# Patient Record
Sex: FEMALE | Race: WHITE | NOT HISPANIC OR LATINO | URBAN - METROPOLITAN AREA
[De-identification: names, ages, dates, MRNs, and addresses within clinical notes are randomized per-mention and may not be internally consistent; named-entity substitution may affect disease eponyms.]

---

## 2017-08-10 ENCOUNTER — OUTPATIENT (OUTPATIENT)
Dept: OUTPATIENT SERVICES | Facility: HOSPITAL | Age: 62
LOS: 1 days | End: 2017-08-10
Payer: COMMERCIAL

## 2017-08-10 VITALS
DIASTOLIC BLOOD PRESSURE: 96 MMHG | WEIGHT: 190.04 LBS | RESPIRATION RATE: 16 BRPM | HEIGHT: 63 IN | SYSTOLIC BLOOD PRESSURE: 157 MMHG | HEART RATE: 83 BPM | TEMPERATURE: 98 F

## 2017-08-10 DIAGNOSIS — N84.0 POLYP OF CORPUS UTERI: ICD-10-CM

## 2017-08-10 DIAGNOSIS — Z98.890 OTHER SPECIFIED POSTPROCEDURAL STATES: Chronic | ICD-10-CM

## 2017-08-10 DIAGNOSIS — Z01.818 ENCOUNTER FOR OTHER PREPROCEDURAL EXAMINATION: ICD-10-CM

## 2017-08-10 LAB
ALBUMIN SERPL ELPH-MCNC: 4 G/DL — SIGNIFICANT CHANGE UP (ref 3.3–5)
ALP SERPL-CCNC: 84 U/L — SIGNIFICANT CHANGE UP (ref 40–120)
ALT FLD-CCNC: 24 U/L — SIGNIFICANT CHANGE UP (ref 12–78)
ANION GAP SERPL CALC-SCNC: 6 MMOL/L — SIGNIFICANT CHANGE UP (ref 5–17)
AST SERPL-CCNC: 14 U/L — LOW (ref 15–37)
BILIRUB SERPL-MCNC: 1.2 MG/DL — SIGNIFICANT CHANGE UP (ref 0.2–1.2)
BUN SERPL-MCNC: 15 MG/DL — SIGNIFICANT CHANGE UP (ref 7–23)
CALCIUM SERPL-MCNC: 9.8 MG/DL — SIGNIFICANT CHANGE UP (ref 8.5–10.1)
CHLORIDE SERPL-SCNC: 106 MMOL/L — SIGNIFICANT CHANGE UP (ref 96–108)
CO2 SERPL-SCNC: 30 MMOL/L — SIGNIFICANT CHANGE UP (ref 22–31)
CREAT SERPL-MCNC: 0.64 MG/DL — SIGNIFICANT CHANGE UP (ref 0.5–1.3)
GLUCOSE SERPL-MCNC: 100 MG/DL — HIGH (ref 70–99)
HCT VFR BLD CALC: 43.3 % — SIGNIFICANT CHANGE UP (ref 34.5–45)
HGB BLD-MCNC: 15.2 G/DL — SIGNIFICANT CHANGE UP (ref 11.5–15.5)
MCHC RBC-ENTMCNC: 31 PG — SIGNIFICANT CHANGE UP (ref 27–34)
MCHC RBC-ENTMCNC: 35.2 GM/DL — SIGNIFICANT CHANGE UP (ref 32–36)
MCV RBC AUTO: 87.9 FL — SIGNIFICANT CHANGE UP (ref 80–100)
PLATELET # BLD AUTO: 280 K/UL — SIGNIFICANT CHANGE UP (ref 150–400)
POTASSIUM SERPL-MCNC: 4.6 MMOL/L — SIGNIFICANT CHANGE UP (ref 3.5–5.3)
POTASSIUM SERPL-SCNC: 4.6 MMOL/L — SIGNIFICANT CHANGE UP (ref 3.5–5.3)
PROT SERPL-MCNC: 7.6 G/DL — SIGNIFICANT CHANGE UP (ref 6–8.3)
RBC # BLD: 4.92 M/UL — SIGNIFICANT CHANGE UP (ref 3.8–5.2)
RBC # FLD: 11.7 % — SIGNIFICANT CHANGE UP (ref 10.3–14.5)
SODIUM SERPL-SCNC: 142 MMOL/L — SIGNIFICANT CHANGE UP (ref 135–145)
WBC # BLD: 7 K/UL — SIGNIFICANT CHANGE UP (ref 3.8–10.5)
WBC # FLD AUTO: 7 K/UL — SIGNIFICANT CHANGE UP (ref 3.8–10.5)

## 2017-08-10 PROCEDURE — 86901 BLOOD TYPING SEROLOGIC RH(D): CPT

## 2017-08-10 PROCEDURE — 80053 COMPREHEN METABOLIC PANEL: CPT

## 2017-08-10 PROCEDURE — 93010 ELECTROCARDIOGRAM REPORT: CPT | Mod: NC

## 2017-08-10 PROCEDURE — 85027 COMPLETE CBC AUTOMATED: CPT

## 2017-08-10 PROCEDURE — 36415 COLL VENOUS BLD VENIPUNCTURE: CPT

## 2017-08-10 PROCEDURE — 93005 ELECTROCARDIOGRAM TRACING: CPT

## 2017-08-10 PROCEDURE — G0463: CPT

## 2017-08-10 PROCEDURE — 86850 RBC ANTIBODY SCREEN: CPT

## 2017-08-10 PROCEDURE — 86900 BLOOD TYPING SEROLOGIC ABO: CPT

## 2017-08-10 NOTE — H&P PST ADULT - FAMILY HISTORY
Father  Still living? No  Family history of heart attack, Age at diagnosis: Age Unknown  Family history of colon cancer, Age at diagnosis: Age Unknown

## 2017-08-10 NOTE — H&P PST ADULT - HISTORY OF PRESENT ILLNESS
61 yo female with HTN, presents to [facility] scheduled for D&C hysteroscopy polypectomy on  with Dr. Elizondo.  0 LMP . Was found to a have a polyp on a routine exam. Denies PMB. H/O uterine polyp and endometrial thickening. H/O D&C .

## 2017-08-10 NOTE — H&P PST ADULT - NSANTHOSAYNRD_GEN_A_CORE
No. ANYI screening performed.  STOP BANG Legend: 0-2 = LOW Risk; 3-4 = INTERMEDIATE Risk; 5-8 = HIGH Risk

## 2017-08-10 NOTE — H&P PST ADULT - PMH
Dry eyes, bilateral    Enlarged thyroid  with nodules "subclinical"  Essential hypertension    Obesity (BMI 30.0-34.9)    Polyp of corpus uteri Dry eyes, bilateral    Enlarged thyroid  with nodules "subclinical"  Essential hypertension    LBBB (left bundle branch block)    Obesity (BMI 30.0-34.9)    Polyp of corpus uteri

## 2017-08-10 NOTE — H&P PST ADULT - PROBLEM SELECTOR PLAN 2
labs- CBC, CMP, T&S and EKG  MC with Dr. ANDREW Kirby  Pre op instructions reviewed and given. Instructed to take her Losartan am of surgery with  sip of water. Hold ASA and Lovaza one week pre op. Avoid OTC supplements. Verbalized understanding.

## 2017-08-17 RX ORDER — SODIUM CHLORIDE 9 MG/ML
1000 INJECTION, SOLUTION INTRAVENOUS
Qty: 0 | Refills: 0 | Status: DISCONTINUED | OUTPATIENT
Start: 2017-08-18 | End: 2017-08-18

## 2017-08-17 RX ORDER — ACETAMINOPHEN 500 MG
975 TABLET ORAL ONCE
Qty: 0 | Refills: 0 | Status: COMPLETED | OUTPATIENT
Start: 2017-08-18 | End: 2017-08-18

## 2017-08-18 ENCOUNTER — OUTPATIENT (OUTPATIENT)
Dept: OUTPATIENT SERVICES | Facility: HOSPITAL | Age: 62
LOS: 1 days | Discharge: HOME | End: 2017-08-18
Payer: COMMERCIAL

## 2017-08-18 VITALS
DIASTOLIC BLOOD PRESSURE: 80 MMHG | SYSTOLIC BLOOD PRESSURE: 167 MMHG | TEMPERATURE: 98 F | OXYGEN SATURATION: 97 % | RESPIRATION RATE: 13 BRPM | WEIGHT: 190.04 LBS | HEIGHT: 63 IN | HEART RATE: 73 BPM

## 2017-08-18 VITALS
HEART RATE: 70 BPM | SYSTOLIC BLOOD PRESSURE: 160 MMHG | OXYGEN SATURATION: 98 % | RESPIRATION RATE: 14 BRPM | DIASTOLIC BLOOD PRESSURE: 64 MMHG

## 2017-08-18 DIAGNOSIS — Z98.890 OTHER SPECIFIED POSTPROCEDURAL STATES: Chronic | ICD-10-CM

## 2017-08-18 DIAGNOSIS — N84.0 POLYP OF CORPUS UTERI: ICD-10-CM

## 2017-08-18 DIAGNOSIS — Z01.818 ENCOUNTER FOR OTHER PREPROCEDURAL EXAMINATION: ICD-10-CM

## 2017-08-18 PROCEDURE — 88305 TISSUE EXAM BY PATHOLOGIST: CPT | Mod: 26

## 2017-08-18 RX ORDER — UBIDECARENONE 100 MG
1 CAPSULE ORAL
Qty: 0 | Refills: 0 | COMMUNITY

## 2017-08-18 RX ORDER — ASPIRIN/CALCIUM CARB/MAGNESIUM 324 MG
1 TABLET ORAL
Qty: 0 | Refills: 0 | COMMUNITY

## 2017-08-18 RX ORDER — CHOLECALCIFEROL (VITAMIN D3) 125 MCG
1 CAPSULE ORAL
Qty: 0 | Refills: 0 | COMMUNITY

## 2017-08-18 RX ORDER — SODIUM CHLORIDE 9 MG/ML
1000 INJECTION, SOLUTION INTRAVENOUS
Qty: 0 | Refills: 0 | Status: DISCONTINUED | OUTPATIENT
Start: 2017-08-18 | End: 2017-09-02

## 2017-08-18 RX ORDER — ACETAMINOPHEN 500 MG
2 TABLET ORAL
Qty: 0 | Refills: 0 | COMMUNITY

## 2017-08-18 RX ORDER — OXYCODONE HYDROCHLORIDE 5 MG/1
5 TABLET ORAL ONCE
Qty: 0 | Refills: 0 | Status: DISCONTINUED | OUTPATIENT
Start: 2017-08-18 | End: 2017-08-18

## 2017-08-18 RX ORDER — OXYCODONE HYDROCHLORIDE 5 MG/1
10 TABLET ORAL ONCE
Qty: 0 | Refills: 0 | Status: DISCONTINUED | OUTPATIENT
Start: 2017-08-18 | End: 2017-08-18

## 2017-08-18 RX ORDER — IBUPROFEN 200 MG
1 TABLET ORAL
Qty: 0 | Refills: 0 | COMMUNITY

## 2017-08-18 RX ORDER — OMEGA-3 ACID ETHYL ESTERS 1 G
2 CAPSULE ORAL
Qty: 0 | Refills: 0 | COMMUNITY

## 2017-08-18 RX ORDER — LOSARTAN POTASSIUM 100 MG/1
1 TABLET, FILM COATED ORAL
Qty: 0 | Refills: 0 | COMMUNITY

## 2017-08-18 RX ORDER — HYDROMORPHONE HYDROCHLORIDE 2 MG/ML
0.5 INJECTION INTRAMUSCULAR; INTRAVENOUS; SUBCUTANEOUS ONCE
Qty: 0 | Refills: 0 | Status: DISCONTINUED | OUTPATIENT
Start: 2017-08-18 | End: 2017-08-18

## 2017-08-18 RX ADMIN — SODIUM CHLORIDE 100 MILLILITER(S): 9 INJECTION, SOLUTION INTRAVENOUS at 10:32

## 2017-08-18 RX ADMIN — SODIUM CHLORIDE 30 MILLILITER(S): 9 INJECTION, SOLUTION INTRAVENOUS at 07:01

## 2017-08-18 RX ADMIN — Medication 975 MILLIGRAM(S): at 07:01

## 2017-08-18 NOTE — BRIEF OPERATIVE NOTE - OPERATION/FINDINGS
Normal sized anteverted uterus. Cervical os with minimal elasticity, but able to dilate adequately. Multiple endometrial polyps, including one at each ostia and another at fundus. 1 cm submucosal myoma.

## 2017-08-18 NOTE — ASU PATIENT PROFILE, ADULT - PMH
Dry eyes, bilateral    Enlarged thyroid  with nodules "subclinical"  Essential hypertension    LBBB (left bundle branch block)    Obesity (BMI 30.0-34.9)    Polyp of corpus uteri

## 2017-08-18 NOTE — ASU DISCHARGE PLAN (ADULT/PEDIATRIC). - ACTIVITY LEVEL
nothing per vagina/x 2 weeks/nothing per rectum/no tub baths/no douching/no heavy lifting/no intercourse/no exercise/no tampons/no sports/gym no douching/nothing per vagina/no sports/gym/nothing per rectum/no intercourse/no tub baths/no exercise/quiet play/no tampons/no heavy lifting/x 2 weeks

## 2017-08-18 NOTE — ASU DISCHARGE PLAN (ADULT/PEDIATRIC). - NOTIFY
Inability to Tolerate Liquids or Foods/Pain not relieved by Medications/Bleeding that does not stop/GYN Fever>100.4/Increased Irritability or Sluggishness/Persistent Nausea and Vomiting/Unable to Urinate Unable to Urinate/Pain not relieved by Medications/Numbness, color, or temperature change to extremity/GYN Fever>100.4/Persistent Nausea and Vomiting/Bleeding that does not stop/Increased Irritability or Sluggishness/Inability to Tolerate Liquids or Foods

## 2017-08-18 NOTE — ASU DISCHARGE PLAN (ADULT/PEDIATRIC). - MEDICATION SUMMARY - MEDICATIONS TO TAKE
I will START or STAY ON the medications listed below when I get home from the hospital:    blink  --   once a day, As Needed  -- Indication: For Home supplement    Tylenol 500 mg oral tablet  -- 2 tab(s) by mouth every 6 hours, As Needed  -- Indication: For Pain med    Motrin  mg oral tablet  -- 1-2 tab(s) by mouth every 6-8 hours as needed for pain.  -- Indication: For Pain med    aspirin 81 mg oral tablet  -- 1 tab(s) by mouth once a day  -- Indication: For Home med    losartan 100 mg oral tablet  -- 1 tab(s) by mouth once a day  -- Indication: For Home med    Lovaza 1000 mg oral capsule  -- 2 cap(s) by mouth once a day  -- Indication: For Home med    CoQ10 300 mg oral capsule  -- 1 cap(s) by mouth once a day  -- Indication: For Home med    Vitamin D3 2000 intl units oral capsule  -- 1 cap(s) by mouth once a day  -- Indication: For Home med

## 2017-08-25 DIAGNOSIS — I44.7 LEFT BUNDLE-BRANCH BLOCK, UNSPECIFIED: ICD-10-CM

## 2017-08-25 DIAGNOSIS — N84.0 POLYP OF CORPUS UTERI: ICD-10-CM

## 2017-08-25 DIAGNOSIS — E78.5 HYPERLIPIDEMIA, UNSPECIFIED: ICD-10-CM

## 2017-08-25 DIAGNOSIS — J45.909 UNSPECIFIED ASTHMA, UNCOMPLICATED: ICD-10-CM

## 2017-08-25 DIAGNOSIS — E66.9 OBESITY, UNSPECIFIED: ICD-10-CM

## 2017-08-25 DIAGNOSIS — I10 ESSENTIAL (PRIMARY) HYPERTENSION: ICD-10-CM

## 2017-08-25 DIAGNOSIS — E05.90 THYROTOXICOSIS, UNSPECIFIED WITHOUT THYROTOXIC CRISIS OR STORM: ICD-10-CM

## 2017-08-25 DIAGNOSIS — Z87.891 PERSONAL HISTORY OF NICOTINE DEPENDENCE: ICD-10-CM

## 2017-08-25 DIAGNOSIS — Z88.0 ALLERGY STATUS TO PENICILLIN: ICD-10-CM

## 2017-08-25 DIAGNOSIS — Z79.82 LONG TERM (CURRENT) USE OF ASPIRIN: ICD-10-CM

## 2017-09-01 PROCEDURE — 58558 HYSTEROSCOPY BIOPSY: CPT

## 2017-09-01 PROCEDURE — 88305 TISSUE EXAM BY PATHOLOGIST: CPT

## 2019-10-09 NOTE — H&P PST ADULT - PRIMARY CARE PROVIDER
Pre-Procedure patient identification:  I am the primary operating surgeon/proceduralist and I have identified the patient on 10/09/19 at 10:08 AM Hector Joel MD  Phone Number: [phone]    Dr. Foster Moreno (PCP)

## 2020-07-30 NOTE — BRIEF OPERATIVE NOTE - PROCEDURE
Referred by: Halie Levin PA-C; Medical Diagnosis (from order):    Diagnosis Information      Diagnosis    V43.64 (ICD-9-CM) - Z96.641 (ICD-10-CM) - Status post total hip replacement, right                Physical Therapy -  Daily Treatment Note    Visit:  6     SUBJECTIVE                                                                                                             I am doing more recently and I have a different kind of pain.          OBJECTIVE                                                                                                                     Range of Motion (ROM)   (norms in parentheses, degrees unless noted, active unless noted):   Hip:      - Flexion (100-120):        • Right: Passive: 80    - ABDuction (40):        • Right: Passive: 30    - External Rotation in prone (45-50):        • Right: Passive: 25        TREATMENT                                                                                                                  Therapeutic Exercise:  Nu step 8 min - subjective taken   Manual Therapy:  STM to hip flexor and rectus femoris and Iliotibial band lying in Nithin stretch   PROM into ER in prone and hooklying   STM into medial sacrum with ER in prone   Adduction stretching long axis   STM to adductors   Therapeutic Activity:  Education and discussed with patient hip replacement and education of new hip and new alignment     Home Exercise Program: (*above indicates provided as part of home exercise program)  Code: [email or web address]   URL: [email or web address]/   Date: 07/11/2020   Prepared by: Jenna Robledo       Exercises Supine Quad Set - 10 reps - 1 sets - 5-10 hold - 2x daily - 7x weekly   Supine Gluteal Sets - 10 reps - 1 sets - 5-10 hold - 2x daily - 7x weekly   Supine Active Ankle Pumps - 15 reps - 1 sets - 5x daily - 7x weekly   Supine Heel Slide - 10 reps - 1 sets - 2x daily - 7x weekly   Seated Long Arc Quad - 10 reps - 1 sets - 2x daily - 7x weekly    Standing Hip Flexion - 10 reps - 1 sets - 2x daily - 7x weekly   Staggered Stance Step Throughs - 15 reps - 1 sets - 2x daily - 7x weekly   Standing Weight Shift Side to Side - 15 reps - 1 sets - 2x daily - 7x weekly   Bent Knee Fallouts - 10 reps - 1x daily - 7x weekly             ASSESSMENT                                                                                                             Patient worried regarding new pain and feeling down due to painfulness. Discussed new symptoms and realignment of hip and reason for pain. Patient feeling better post stretching and manual today.     Pain/symptoms after session: 2  Patient Education:   Results of above outlined education: Verbalizes understanding and Demonstrates understanding     PLAN                                                                                                                             Suggestions for next session as indicated: Progress per plan of care, continue hip stretching, LE strength training        Procedures and total treatment time documented Time Entry flowsheet.     Hysteroscopy with dilation and curettage of uterus  08/18/2017  Polypectomy  Active  TSANTIAGOE

## 2025-06-05 PROBLEM — H04.123 DRY EYE SYNDROME OF BILATERAL LACRIMAL GLANDS: Chronic | Status: ACTIVE | Noted: 2017-08-10

## 2025-06-05 PROBLEM — I44.7 LEFT BUNDLE-BRANCH BLOCK, UNSPECIFIED: Chronic | Status: ACTIVE | Noted: 2017-08-10

## 2025-06-05 PROBLEM — N84.0 POLYP OF CORPUS UTERI: Chronic | Status: ACTIVE | Noted: 2017-08-10

## 2025-06-05 PROBLEM — E66.9 OBESITY, UNSPECIFIED: Chronic | Status: ACTIVE | Noted: 2017-08-10

## 2025-06-05 PROBLEM — I10 ESSENTIAL (PRIMARY) HYPERTENSION: Chronic | Status: ACTIVE | Noted: 2017-08-10

## 2025-06-06 ENCOUNTER — OUTPATIENT (OUTPATIENT)
Dept: OUTPATIENT SERVICES | Facility: HOSPITAL | Age: 70
LOS: 1 days | End: 2025-06-06
Payer: MEDICARE

## 2025-06-06 VITALS
SYSTOLIC BLOOD PRESSURE: 144 MMHG | OXYGEN SATURATION: 96 % | TEMPERATURE: 98 F | RESPIRATION RATE: 16 BRPM | HEIGHT: 61 IN | WEIGHT: 194.01 LBS | HEART RATE: 96 BPM | DIASTOLIC BLOOD PRESSURE: 80 MMHG

## 2025-06-06 DIAGNOSIS — E05.90 THYROTOXICOSIS, UNSPECIFIED WITHOUT THYROTOXIC CRISIS OR STORM: ICD-10-CM

## 2025-06-06 DIAGNOSIS — Z98.890 OTHER SPECIFIED POSTPROCEDURAL STATES: Chronic | ICD-10-CM

## 2025-06-06 DIAGNOSIS — N95.0 POSTMENOPAUSAL BLEEDING: ICD-10-CM

## 2025-06-06 DIAGNOSIS — Z01.818 ENCOUNTER FOR OTHER PREPROCEDURAL EXAMINATION: ICD-10-CM

## 2025-06-06 DIAGNOSIS — I10 ESSENTIAL (PRIMARY) HYPERTENSION: ICD-10-CM

## 2025-06-06 LAB
ANION GAP SERPL CALC-SCNC: 8 MMOL/L — SIGNIFICANT CHANGE UP (ref 5–17)
BUN SERPL-MCNC: 16 MG/DL — SIGNIFICANT CHANGE UP (ref 7–23)
CALCIUM SERPL-MCNC: 9.1 MG/DL — SIGNIFICANT CHANGE UP (ref 8.5–10.1)
CHLORIDE SERPL-SCNC: 102 MMOL/L — SIGNIFICANT CHANGE UP (ref 96–108)
CO2 SERPL-SCNC: 26 MMOL/L — SIGNIFICANT CHANGE UP (ref 22–31)
CREAT SERPL-MCNC: 0.94 MG/DL — SIGNIFICANT CHANGE UP (ref 0.5–1.3)
EGFR: 64 ML/MIN/1.73M2 — SIGNIFICANT CHANGE UP
EGFR: 64 ML/MIN/1.73M2 — SIGNIFICANT CHANGE UP
GLUCOSE SERPL-MCNC: 97 MG/DL — SIGNIFICANT CHANGE UP (ref 70–99)
HCT VFR BLD CALC: 40.8 % — SIGNIFICANT CHANGE UP (ref 34.5–45)
HGB BLD-MCNC: 14.2 G/DL — SIGNIFICANT CHANGE UP (ref 11.5–15.5)
MCHC RBC-ENTMCNC: 29.8 PG — SIGNIFICANT CHANGE UP (ref 27–34)
MCHC RBC-ENTMCNC: 34.8 G/DL — SIGNIFICANT CHANGE UP (ref 32–36)
MCV RBC AUTO: 85.7 FL — SIGNIFICANT CHANGE UP (ref 80–100)
NRBC BLD AUTO-RTO: 0 /100 WBCS — SIGNIFICANT CHANGE UP (ref 0–0)
PLATELET # BLD AUTO: 286 K/UL — SIGNIFICANT CHANGE UP (ref 150–400)
POTASSIUM SERPL-MCNC: 3.4 MMOL/L — LOW (ref 3.5–5.3)
POTASSIUM SERPL-SCNC: 3.4 MMOL/L — LOW (ref 3.5–5.3)
RBC # BLD: 4.76 M/UL — SIGNIFICANT CHANGE UP (ref 3.8–5.2)
RBC # FLD: 12.8 % — SIGNIFICANT CHANGE UP (ref 10.3–14.5)
SODIUM SERPL-SCNC: 136 MMOL/L — SIGNIFICANT CHANGE UP (ref 135–145)
WBC # BLD: 8.01 K/UL — SIGNIFICANT CHANGE UP (ref 3.8–10.5)
WBC # FLD AUTO: 8.01 K/UL — SIGNIFICANT CHANGE UP (ref 3.8–10.5)

## 2025-06-06 PROCEDURE — 86850 RBC ANTIBODY SCREEN: CPT

## 2025-06-06 PROCEDURE — 86900 BLOOD TYPING SEROLOGIC ABO: CPT

## 2025-06-06 PROCEDURE — G0463: CPT

## 2025-06-06 PROCEDURE — 85027 COMPLETE CBC AUTOMATED: CPT

## 2025-06-06 PROCEDURE — 93010 ELECTROCARDIOGRAM REPORT: CPT

## 2025-06-06 PROCEDURE — 93005 ELECTROCARDIOGRAM TRACING: CPT

## 2025-06-06 PROCEDURE — 36415 COLL VENOUS BLD VENIPUNCTURE: CPT

## 2025-06-06 PROCEDURE — 86901 BLOOD TYPING SEROLOGIC RH(D): CPT

## 2025-06-06 PROCEDURE — 80048 BASIC METABOLIC PNL TOTAL CA: CPT

## 2025-06-06 NOTE — H&P PST ADULT - NSICDXFAMILYHX_GEN_ALL_CORE_FT
FAMILY HISTORY:  Father  Still living? No  Family history of colon cancer, Age at diagnosis: Age Unknown  Family history of heart attack, Age at diagnosis: Age Unknown

## 2025-06-06 NOTE — H&P PST ADULT - PROBLEM SELECTOR PLAN 1
D&C, hysteroscopy, poss polypectomy w/Myosure VS hysteroscopic myomectomy om 6/11/25  labs- CBC, BMP, T&S, EKG  Pre op instructions discussed, verbalized understanding  pre op PCP Evaluation

## 2025-06-06 NOTE — H&P PST ADULT - NSICDXPASTMEDICALHX_GEN_ALL_CORE_FT
PAST MEDICAL HISTORY:  Dry eyes, bilateral     Essential hypertension     H/O hyperthyroidism     HLD (hyperlipidemia)     LBBB (left bundle branch block)     Obesity (BMI 30.0-34.9)     Personal history UTI     Polyp of corpus uteri     Thyroid nodule     Uterine myoma      PAST MEDICAL HISTORY:  Dry eyes, bilateral     Essential hypertension     H/O hyperthyroidism     HLD (hyperlipidemia)     LBBB (left bundle branch block)     Obesity (BMI 30.0-34.9)     Personal history UTI     Polyp of corpus uteri     Postmenopausal bleeding     Thyroid nodule     Uterine myoma

## 2025-06-06 NOTE — H&P PST ADULT - NSICDXPASTSURGICALHX_GEN_ALL_CORE_FT
PAST SURGICAL HISTORY:  H/O colonoscopy 2014    S/P D&C (status post dilation and curettage) 2003

## 2025-06-06 NOTE — H&P PST ADULT - ASSESSMENT
71 yo F scheduled for   D&C, hysteroscopy, poss polypectomy w/Myosure VS hysteroscopic myomectomy om 6/11/25

## 2025-06-06 NOTE — H&P PST ADULT - HISTORY OF PRESENT ILLNESS
69 yo F with h/o HTN, HLD, LBBB, Hyperthyroidism, endometrial hyperplasia c/o post menopausal bleed since 3/2025.   Had GYN evaluation- s/p pelvic US demonstrated uterine myoma. Pt scheduled for  D&C, hysteroscopy, poss polypectomy w/Myosure VS hysteroscopic myomectomy om 6/11/25    **Denies any CP/SOB, abdominal pain, N/V  **Pt on Bactrim for UTI @ present , LD on 6/7/25

## 2025-06-10 RX ORDER — METOCLOPRAMIDE HCL 10 MG
10 TABLET ORAL ONCE
Refills: 0 | Status: DISCONTINUED | OUTPATIENT
Start: 2025-06-11 | End: 2025-06-16

## 2025-06-10 RX ORDER — SODIUM CHLORIDE 9 G/1000ML
1000 INJECTION, SOLUTION INTRAVENOUS
Refills: 0 | Status: DISCONTINUED | OUTPATIENT
Start: 2025-06-11 | End: 2025-06-25

## 2025-06-10 RX ORDER — HYDROMORPHONE/SOD CHLOR,ISO/PF 2 MG/10 ML
0.5 SYRINGE (ML) INJECTION ONCE
Refills: 0 | Status: DISCONTINUED | OUTPATIENT
Start: 2025-06-11 | End: 2025-06-16

## 2025-06-10 RX ORDER — OXYCODONE HYDROCHLORIDE 30 MG/1
5 TABLET ORAL ONCE
Refills: 0 | Status: DISCONTINUED | OUTPATIENT
Start: 2025-06-11 | End: 2025-06-16

## 2025-06-10 NOTE — ASU PATIENT PROFILE, ADULT - NS PREOP MEDICATION GIVEN
88 y/o Male w/ MHx of dementia, Multiple Myeloma on Hospice DNR/I with no aggressive measures to be done except medical therapy such as treatment of infections and transfusions only if would provide comfort who p/w fevers at home. According to daughter at bedside yesterday patient had some vomiting and then during day today has become more lethargic and was febrile. Family also noted a wet cough, but denies any new rashes, no blood in vomit, no diarrhea, no new one sided deficits. Pt normal conversive with family be confused about place and date and time. Pt confused, collateral history was obtained from the family, per ED notes, initially 3 daughters at bedside, including one daughter who is an RN; The pt is usually able to speak with some coherence, has been confused today with decreased PO intake. Family prefer no invasive interventions. 88 y/o Male w/ MHx of Alzheimer's (oriented usually to self only), HTN, DM Type 2, CAD s/p bypass c/b A-fib (not on ASA, on home hospice), multiple myeloma (on hospice DNR/I) with no aggressive measures to be done except medical therapy such as treatment of infections and transfusions only if would provide comfort, recent admission for aspiration PNA and bacteremia: BCx 8/20 grew E. coli, BCx 8/22 grew K. pneumonia; On this admission the pt presents with fevers at home. According to daughter at bedside yesterday patient had some vomiting and then during day today has become more lethargic and was febrile. Family also noted a wet cough, but denies any new rashes, no blood in vomit, no diarrhea, no new one sided deficits. Pt normal conversive with family be confused about place and date and time. Pt confused, collateral history was obtained from the family, per ED notes, initially 3 daughters at bedside, including one daughter who is an RN; The pt is usually able to speak with some coherence, has been confused today with decreased PO intake. Family prefer no invasive interventions. yes 86 y/o Male w/ MHx of Alzheimer's (oriented usually to self only), HTN, DM Type 2, CAD s/p bypass c/b A-fib (not on ASA, on home hospice), multiple myeloma c/b multiple osseous lytic lesions with moderate compression Fx of T12 and L3 per CT A/P dated 8/23/18 (on hospice DNR/I) with no aggressive measures to be done except medical therapy such as treatment of infections and transfusions only if would provide comfort, recent admission for aspiration PNA and bacteremia: BCx 8/20 grew E. coli, BCx 8/22 grew K. pneumonia; On this admission the pt presents with fevers at home. According to daughter at bedside yesterday patient had some vomiting and then during day today has become more lethargic and was febrile. Family also noted a wet cough, but denies any new rashes, no blood in vomit, no diarrhea, no new one sided deficits. Pt normal conversive with family be confused about place and date and time. Pt confused, collateral history was obtained from the family, per ED notes, initially 3 daughters at bedside, including one daughter who is an RN; The pt is usually able to speak with some coherence, has been confused today with decreased PO intake. Family prefer no invasive interventions. No medical FHx of MM in parents or siblings;

## 2025-06-10 NOTE — ASU PATIENT PROFILE, ADULT - HEALTH/HEALTHCARE ANXIETIES, PROFILE
pre op anxiety Sski Pregnancy And Lactation Text: This medication is Pregnancy Category D and isn't considered safe during pregnancy. It is excreted in breast milk.

## 2025-06-10 NOTE — ASU PATIENT PROFILE, ADULT - NSICDXPASTMEDICALHX_GEN_ALL_CORE_FT
PAST MEDICAL HISTORY:  Dry eyes, bilateral     Essential hypertension     H/O hyperthyroidism     HLD (hyperlipidemia)     LBBB (left bundle branch block)     Obesity (BMI 30.0-34.9)     Personal history UTI     Polyp of corpus uteri     Postmenopausal bleeding     Thyroid nodule     Uterine myoma

## 2025-06-10 NOTE — ASU PATIENT PROFILE, ADULT - FALL HARM RISK - UNIVERSAL INTERVENTIONS
Bed in lowest position, wheels locked, appropriate side rails in place/Call bell, personal items and telephone in reach/Instruct patient to call for assistance before getting out of bed or chair/Non-slip footwear when patient is out of bed/Clever to call system/Physically safe environment - no spills, clutter or unnecessary equipment/Purposeful Proactive Rounding/Room/bathroom lighting operational, light cord in reach

## 2025-06-11 ENCOUNTER — OUTPATIENT (OUTPATIENT)
Dept: OUTPATIENT SERVICES | Facility: HOSPITAL | Age: 70
LOS: 1 days | End: 2025-06-11
Payer: MEDICARE

## 2025-06-11 ENCOUNTER — TRANSCRIPTION ENCOUNTER (OUTPATIENT)
Age: 70
End: 2025-06-11

## 2025-06-11 VITALS
RESPIRATION RATE: 12 BRPM | HEART RATE: 63 BPM | SYSTOLIC BLOOD PRESSURE: 141 MMHG | TEMPERATURE: 98 F | OXYGEN SATURATION: 100 % | DIASTOLIC BLOOD PRESSURE: 67 MMHG

## 2025-06-11 VITALS
HEART RATE: 72 BPM | SYSTOLIC BLOOD PRESSURE: 135 MMHG | OXYGEN SATURATION: 96 % | TEMPERATURE: 98 F | HEIGHT: 61 IN | DIASTOLIC BLOOD PRESSURE: 68 MMHG | RESPIRATION RATE: 16 BRPM | WEIGHT: 194.01 LBS

## 2025-06-11 DIAGNOSIS — Z98.890 OTHER SPECIFIED POSTPROCEDURAL STATES: Chronic | ICD-10-CM

## 2025-06-11 DIAGNOSIS — N95.0 POSTMENOPAUSAL BLEEDING: ICD-10-CM

## 2025-06-11 PROCEDURE — 88341 IMHCHEM/IMCYTCHM EA ADD ANTB: CPT | Mod: 26

## 2025-06-11 PROCEDURE — 88342 IMHCHEM/IMCYTCHM 1ST ANTB: CPT | Mod: 26

## 2025-06-11 PROCEDURE — 88342 IMHCHEM/IMCYTCHM 1ST ANTB: CPT

## 2025-06-11 PROCEDURE — C1782: CPT

## 2025-06-11 PROCEDURE — 88305 TISSUE EXAM BY PATHOLOGIST: CPT

## 2025-06-11 PROCEDURE — 58558 HYSTEROSCOPY BIOPSY: CPT

## 2025-06-11 PROCEDURE — 88305 TISSUE EXAM BY PATHOLOGIST: CPT | Mod: 26

## 2025-06-11 PROCEDURE — 88341 IMHCHEM/IMCYTCHM EA ADD ANTB: CPT

## 2025-06-11 DEVICE — MYOSURE TISSUE REMOVAL DEVICE REACH: Type: IMPLANTABLE DEVICE | Status: FUNCTIONAL

## 2025-06-11 RX ORDER — SULFAMETHOXAZOLE/TRIMETHOPRIM 800-160 MG
1 TABLET ORAL
Refills: 0 | DISCHARGE

## 2025-06-11 RX ORDER — ACETAMINOPHEN 500 MG/5ML
975 LIQUID (ML) ORAL ONCE
Refills: 0 | Status: COMPLETED | OUTPATIENT
Start: 2025-06-11 | End: 2025-06-11

## 2025-06-11 RX ADMIN — Medication 975 MILLIGRAM(S): at 06:50

## 2025-06-11 RX ADMIN — SODIUM CHLORIDE 75 MILLILITER(S): 9 INJECTION, SOLUTION INTRAVENOUS at 08:50

## 2025-06-11 NOTE — ASU DISCHARGE PLAN (ADULT/PEDIATRIC) - CARE PROVIDER_API CALL
Sharon Hernandez  Obstetrics and Gynecology  [address] [place] [address]  Phone: [phone]  Fax: [phone]  Established Patient  Follow Up Time: 2 weeks

## 2025-06-11 NOTE — ASU DISCHARGE PLAN (ADULT/PEDIATRIC) - NURSING INSTRUCTIONS
Tylenol was given to you at 650am, do not take tylenol again until 1250pm or later as needed.    Toradol (similar to IV motrin) was given to you at 820am - do not take ibuprofen again until 220pm or later as needed.     You can alternate between tylenol and ibuprofen every 3 hours as needed for pain.

## 2025-06-11 NOTE — BRIEF OPERATIVE NOTE - NSICDXBRIEFPOSTOP_GEN_ALL_CORE_FT
POST-OP DIAGNOSIS:  Postmenopausal bleeding 11-Jun-2025 09:03:36  Sharon Hernandez  Endometrial polyp 11-Jun-2025 09:03:31  Sharon Hernandez  Thickened endometrium 11-Jun-2025 09:03:28  Sharon Hernandez

## 2025-06-11 NOTE — BRIEF OPERATIVE NOTE - OPERATION/FINDINGS
EUA: Small anteverted uterus. Vaginal atrophy and narrow introitus. Cervix slightly flushed into surrounding walls. Hysteroscopy: Multiple irregularly shaped endometrial polyps, excised. Uneventful procedure. Hysteroscopic deficit 100 mL. EBL 10 mL.

## 2025-06-11 NOTE — BRIEF OPERATIVE NOTE - NSICDXBRIEFPREOP_GEN_ALL_CORE_FT
PRE-OP DIAGNOSIS:  Thickened endometrium 11-Jun-2025 09:03:01  Sharon Hernandez  Endometrial polyp 11-Jun-2025 09:03:22  Sharon Hernandez  Postmenopausal bleeding 11-Jun-2025 09:03:09  Sharon Hernandez

## 2025-06-11 NOTE — ASU DISCHARGE PLAN (ADULT/PEDIATRIC) - FINANCIAL ASSISTANCE
[facility] provides services at a reduced cost to those who are determined to be eligible through [facility]’s financial assistance program. Information regarding [facility]’s financial assistance program can be found by going to [email or web address] or by calling [phone].

## 2025-06-11 NOTE — BRIEF OPERATIVE NOTE - NSICDXBRIEFPROCEDURE_GEN_ALL_CORE_FT
PROCEDURES:  Hysteroscopy, with dilation and curettage 11-Jun-2025 09:02:42  Sharon Hernandez  Hysteroscopic polypectomy using MyoSure tissue removal system 11-Jun-2025 09:02:49  Sharon Hernandez

## 2025-06-17 LAB — SURGICAL PATHOLOGY STUDY: SIGNIFICANT CHANGE UP

## 2025-06-20 ENCOUNTER — NON-APPOINTMENT (OUTPATIENT)
Age: 70
End: 2025-06-20

## 2025-06-20 PROBLEM — Z00.00 ENCOUNTER FOR PREVENTIVE HEALTH EXAMINATION: Status: ACTIVE | Noted: 2025-06-20

## 2025-06-21 PROBLEM — Z87.440 PERSONAL HISTORY OF URINARY (TRACT) INFECTIONS: Chronic | Status: ACTIVE | Noted: 2025-06-06

## 2025-06-21 PROBLEM — D25.9 LEIOMYOMA OF UTERUS, UNSPECIFIED: Chronic | Status: ACTIVE | Noted: 2025-06-06

## 2025-06-21 PROBLEM — E78.5 HYPERLIPIDEMIA, UNSPECIFIED: Chronic | Status: ACTIVE | Noted: 2025-06-06

## 2025-06-21 PROBLEM — Z86.39 PERSONAL HISTORY OF OTHER ENDOCRINE, NUTRITIONAL AND METABOLIC DISEASE: Chronic | Status: ACTIVE | Noted: 2025-06-06

## 2025-06-21 PROBLEM — N95.0 POSTMENOPAUSAL BLEEDING: Chronic | Status: ACTIVE | Noted: 2025-06-06

## 2025-06-21 PROBLEM — E04.1 NONTOXIC SINGLE THYROID NODULE: Chronic | Status: ACTIVE | Noted: 2025-06-06

## 2025-06-26 ENCOUNTER — NON-APPOINTMENT (OUTPATIENT)
Age: 70
End: 2025-06-26

## 2025-06-26 ENCOUNTER — RESULT REVIEW (OUTPATIENT)
Age: 70
End: 2025-06-26

## 2025-06-26 ENCOUNTER — APPOINTMENT (OUTPATIENT)
Facility: CLINIC | Age: 70
End: 2025-06-26
Payer: MEDICARE

## 2025-06-26 VITALS
OXYGEN SATURATION: 92 % | HEART RATE: 90 BPM | WEIGHT: 193 LBS | BODY MASS INDEX: 35.51 KG/M2 | TEMPERATURE: 98.2 F | HEIGHT: 62 IN | SYSTOLIC BLOOD PRESSURE: 147 MMHG | DIASTOLIC BLOOD PRESSURE: 82 MMHG

## 2025-06-26 PROBLEM — I10 HTN (HYPERTENSION), BENIGN: Status: ACTIVE | Noted: 2025-06-26

## 2025-06-26 PROBLEM — E78.5 HLD (HYPERLIPIDEMIA): Status: ACTIVE | Noted: 2025-06-26

## 2025-06-26 PROCEDURE — 99205 OFFICE O/P NEW HI 60 MIN: CPT

## 2025-06-26 PROCEDURE — 99459 PELVIC EXAMINATION: CPT

## 2025-06-27 ENCOUNTER — OUTPATIENT (OUTPATIENT)
Dept: OUTPATIENT SERVICES | Facility: HOSPITAL | Age: 70
LOS: 1 days | End: 2025-06-27

## 2025-06-27 ENCOUNTER — APPOINTMENT (OUTPATIENT)
Dept: CT IMAGING | Facility: CLINIC | Age: 70
End: 2025-06-27
Payer: MEDICARE

## 2025-06-27 VITALS
RESPIRATION RATE: 16 BRPM | OXYGEN SATURATION: 96 % | DIASTOLIC BLOOD PRESSURE: 85 MMHG | TEMPERATURE: 97 F | SYSTOLIC BLOOD PRESSURE: 136 MMHG | HEIGHT: 61 IN | HEART RATE: 83 BPM | WEIGHT: 192.02 LBS

## 2025-06-27 DIAGNOSIS — C54.1 MALIGNANT NEOPLASM OF ENDOMETRIUM: ICD-10-CM

## 2025-06-27 DIAGNOSIS — I10 ESSENTIAL (PRIMARY) HYPERTENSION: ICD-10-CM

## 2025-06-27 DIAGNOSIS — Z90.89 ACQUIRED ABSENCE OF OTHER ORGANS: Chronic | ICD-10-CM

## 2025-06-27 DIAGNOSIS — E05.90 THYROTOXICOSIS, UNSPECIFIED WITHOUT THYROTOXIC CRISIS OR STORM: ICD-10-CM

## 2025-06-27 DIAGNOSIS — Z98.890 OTHER SPECIFIED POSTPROCEDURAL STATES: Chronic | ICD-10-CM

## 2025-06-27 LAB
A1C WITH ESTIMATED AVERAGE GLUCOSE RESULT: 4.7 % — SIGNIFICANT CHANGE UP (ref 4–5.6)
ANION GAP SERPL CALC-SCNC: 13 MMOL/L — SIGNIFICANT CHANGE UP (ref 7–14)
APPEARANCE UR: CLEAR — SIGNIFICANT CHANGE UP
BACTERIA # UR AUTO: NEGATIVE /HPF — SIGNIFICANT CHANGE UP
BASOPHILS # BLD AUTO: 0.03 K/UL — SIGNIFICANT CHANGE UP (ref 0–0.2)
BASOPHILS NFR BLD AUTO: 0.5 % — SIGNIFICANT CHANGE UP (ref 0–2)
BILIRUB UR-MCNC: NEGATIVE — SIGNIFICANT CHANGE UP
BLD GP AB SCN SERPL QL: NEGATIVE — SIGNIFICANT CHANGE UP
BUN SERPL-MCNC: 16 MG/DL — SIGNIFICANT CHANGE UP (ref 7–23)
CALCIUM SERPL-MCNC: 9.8 MG/DL — SIGNIFICANT CHANGE UP (ref 8.4–10.5)
CAST: 0 /LPF — SIGNIFICANT CHANGE UP (ref 0–4)
CHLORIDE SERPL-SCNC: 99 MMOL/L — SIGNIFICANT CHANGE UP (ref 98–107)
CO2 SERPL-SCNC: 24 MMOL/L — SIGNIFICANT CHANGE UP (ref 22–31)
COLOR SPEC: YELLOW — SIGNIFICANT CHANGE UP
CREAT SERPL-MCNC: 0.79 MG/DL — SIGNIFICANT CHANGE UP (ref 0.5–1.3)
DIFF PNL FLD: NEGATIVE — SIGNIFICANT CHANGE UP
EGFR: 79 ML/MIN/1.73M2 — SIGNIFICANT CHANGE UP
EGFR: 79 ML/MIN/1.73M2 — SIGNIFICANT CHANGE UP
EOSINOPHIL # BLD AUTO: 0.16 K/UL — SIGNIFICANT CHANGE UP (ref 0–0.5)
EOSINOPHIL NFR BLD AUTO: 2.8 % — SIGNIFICANT CHANGE UP (ref 0–6)
ESTIMATED AVERAGE GLUCOSE: 88 — SIGNIFICANT CHANGE UP
GLUCOSE SERPL-MCNC: 93 MG/DL — SIGNIFICANT CHANGE UP (ref 70–99)
GLUCOSE UR QL: NEGATIVE MG/DL — SIGNIFICANT CHANGE UP
HCT VFR BLD CALC: 41.4 % — SIGNIFICANT CHANGE UP (ref 34.5–45)
HGB BLD-MCNC: 14.2 G/DL — SIGNIFICANT CHANGE UP (ref 11.5–15.5)
IMM GRANULOCYTES NFR BLD AUTO: 0.2 % — SIGNIFICANT CHANGE UP (ref 0–0.9)
KETONES UR QL: NEGATIVE MG/DL — SIGNIFICANT CHANGE UP
LEUKOCYTE ESTERASE UR-ACNC: ABNORMAL
LYMPHOCYTES # BLD AUTO: 1 K/UL — SIGNIFICANT CHANGE UP (ref 1–3.3)
LYMPHOCYTES # BLD AUTO: 17.6 % — SIGNIFICANT CHANGE UP (ref 13–44)
MCHC RBC-ENTMCNC: 30.3 PG — SIGNIFICANT CHANGE UP (ref 27–34)
MCHC RBC-ENTMCNC: 34.3 G/DL — SIGNIFICANT CHANGE UP (ref 32–36)
MCV RBC AUTO: 88.5 FL — SIGNIFICANT CHANGE UP (ref 80–100)
MONOCYTES # BLD AUTO: 0.5 K/UL — SIGNIFICANT CHANGE UP (ref 0–0.9)
MONOCYTES NFR BLD AUTO: 8.8 % — SIGNIFICANT CHANGE UP (ref 2–14)
NEUTROPHILS # BLD AUTO: 3.99 K/UL — SIGNIFICANT CHANGE UP (ref 1.8–7.4)
NEUTROPHILS NFR BLD AUTO: 70.1 % — SIGNIFICANT CHANGE UP (ref 43–77)
NITRITE UR-MCNC: NEGATIVE — SIGNIFICANT CHANGE UP
PH UR: 7 — SIGNIFICANT CHANGE UP (ref 5–8)
PLATELET # BLD AUTO: 280 K/UL — SIGNIFICANT CHANGE UP (ref 150–400)
POTASSIUM SERPL-MCNC: 3.8 MMOL/L — SIGNIFICANT CHANGE UP (ref 3.5–5.3)
POTASSIUM SERPL-SCNC: 3.8 MMOL/L — SIGNIFICANT CHANGE UP (ref 3.5–5.3)
PROT UR-MCNC: NEGATIVE MG/DL — SIGNIFICANT CHANGE UP
RBC # BLD: 4.68 M/UL — SIGNIFICANT CHANGE UP (ref 3.8–5.2)
RBC # FLD: 12.9 % — SIGNIFICANT CHANGE UP (ref 10.3–14.5)
RBC CASTS # UR COMP ASSIST: 0 /HPF — SIGNIFICANT CHANGE UP (ref 0–4)
RH IG SCN BLD-IMP: POSITIVE — SIGNIFICANT CHANGE UP
RH IG SCN BLD-IMP: POSITIVE — SIGNIFICANT CHANGE UP
SODIUM SERPL-SCNC: 136 MMOL/L — SIGNIFICANT CHANGE UP (ref 135–145)
SP GR SPEC: 1.01 — SIGNIFICANT CHANGE UP (ref 1–1.03)
SQUAMOUS # UR AUTO: 1 /HPF — SIGNIFICANT CHANGE UP (ref 0–5)
UROBILINOGEN FLD QL: 0.2 MG/DL — SIGNIFICANT CHANGE UP (ref 0.2–1)
WBC # BLD: 5.69 K/UL — SIGNIFICANT CHANGE UP (ref 3.8–10.5)
WBC # FLD AUTO: 5.69 K/UL — SIGNIFICANT CHANGE UP (ref 3.8–10.5)
WBC UR QL: 1 /HPF — SIGNIFICANT CHANGE UP (ref 0–5)

## 2025-06-27 PROCEDURE — 71250 CT THORAX DX C-: CPT

## 2025-06-27 PROCEDURE — 74177 CT ABD & PELVIS W/CONTRAST: CPT

## 2025-06-27 RX ORDER — AMLODIPINE BESYLATE 10 MG/1
1 TABLET ORAL
Refills: 0 | DISCHARGE

## 2025-06-27 RX ORDER — OMEGA-3-ACID ETHYL ESTERS CAPSULES 1 G/1
1 CAPSULE, LIQUID FILLED ORAL
Refills: 0 | DISCHARGE

## 2025-06-27 RX ORDER — B1/B2/B3/B5/B6/B12/VIT C/FOLIC 500-0.5 MG
1 TABLET ORAL
Refills: 0 | DISCHARGE

## 2025-06-27 NOTE — H&P PST ADULT - NSICDXPASTSURGICALHX_GEN_ALL_CORE_FT
PAST SURGICAL HISTORY:  H/O colonoscopy 2014    S/P D&C (status post dilation and curettage) 2003     PAST SURGICAL HISTORY:  H/O colonoscopy 2014    History of tonsillectomy     S/P D&C (status post dilation and curettage) 2003

## 2025-06-27 NOTE — H&P PST ADULT - NEGATIVE IMMUNOLOGICAL SYMPTOMS
"  Nephrology Clinic Note  January 17, 2023    I was asked to see this patient by Dr. Rust    CC: Recurrent Michael    HPI: Genia Gonzalez is a 49 FREESTYLE DAMARIS 2 SENSOR) [facility], Ibrahima Sanchez MD;  Location:  HEART CARDIAC CATH LAB     CV HEART BIOPSY N/A 04/28/2022    Procedure: Heart Biopsy;  Surgeon: Sloan Nuñez MD;  Location:  HEART CARDIAC CATH LAB     CV HEART BIOPSY N/A 9/7/2022    Procedure: Heart Biopsy;  Surgeon: Josh Moreno MD;  Location:  HEART CARDIAC CATH LAB     CV HEART BIOPSY N/A 9/14/2022    Procedure: Heart Biopsy;  Surgeon: Berto Sales MD;  Location:  HEART CARDIAC CATH LAB     CV HEART BIOPSY N/A 9/20/2022    Procedure: Heart Biopsy;  Surgeon: Ibrahima Sanchez MD;  Location:  HEART CARDIAC CATH LAB     CV HEART BIOPSY N/A 9/27/2022    Procedure: Heart Biopsy;  Surgeon: Ibrahima Sanchez MD;  Location:  HEART CARDIAC CATH LAB     CV HEART BIOPSY N/A 10/12/2022    Procedure: Heart Biopsy;  Surgeon: Berto Sales MD;  Location:  HEART CARDIAC CATH LAB     CV HEART BIOPSY N/A 10/26/2022    Procedure: Heart Cath Heart Biopsy;  Surgeon: Josh Moreno MD;  Location:   HEART CARDIAC CATH LAB     CV HEART BIOPSY N/A 11/8/2022    Procedure: Heart Biopsy;  Surgeon: Ibrahima Sanchez MD;  [facility]     CV HEART BIOPSY N/A 11/30/2022    Procedure: Heart Biopsy;  Surgeon: Claudette Geiger MD;  Location:  HEART CARDIAC CATH LAB     CV HEART BIOPSY N/A 12/28/2022    Procedure: Heart Cath Heart Biopsy;  Surgeon: Chuck Gonzalez MD;  Location:  HEART CARDIAC CATH LAB     CV INTRA AORTIC BALLOON N/A 08/26/2022    Procedure: Intraprocedure Aortic Balloon Pump Insertion;  Surgeon: Ibrahima Sanchez MD;  Location:  HEART CARDIAC CATH LAB     CV INTRAVASULAR ULTRASOUND N/A 9/27/2022    Procedure: Intravascular Ultrasound;  Surgeon: Ibrahima Sanchez MD;  Location:  HEART CARDIAC CATH LAB     CV RIGHT HEART CATH MEASUREMENTS RECORDED N/A 04/27/2022    Procedure: Heart Cath Right Heart Cath;  Surgeon: Chuck Gonzalez MD;  Location:  HEART CARDIAC CATH LAB     CV RIGHT HEART CATH MEASUREMENTS RECORDED N/A 07/01/2022    Procedure: Right Heart Cath;  Surgeon: Ibrahima Sanchez MD;  Location:  HEART CARDIAC CATH LAB     CV RIGHT HEART CATH MEASUREMENTS RECORDED N/A 9/7/2022    Procedure: Right Heart Catheterization;  Surgeon: Josh Moreno MD;  Location:  HEART CARDIAC CATH LAB     CV RIGHT HEART CATH MEASUREMENTS RECORDED N/A 9/14/2022    Procedure: Right Heart Catheterization;  Surgeon: Berto Sales MD;  Location:  HEART CARDIAC CATH LAB     CV RIGHT HEART CATH MEASUREMENTS RECORDED N/A 9/20/2022    Procedure: Right Heart Catheterization;  Surgeon: Ibrahima Sanchez MD;  Location:  HEART CARDIAC CATH LAB     CV RIGHT HEART CATH MEASUREMENTS RECORDED N/A 9/27/2022    Procedure: Right Heart Catheterization;  Surgeon: Ibrahima Sanchez MD;  Location:  HEART CARDIAC CATH LAB     CV RIGHT HEART CATH MEASUREMENTS RECORDED N/A 10/12/2022    Procedure: Right Heart  Catheterization;  Surgeon: Berto Sales MD;  Location:  HEART CARDIAC CATH LAB     CV RIGHT HEART CATH MEASUREMENTS RECORDED N/A 11/8/2022    Procedure: Right Heart Catheterization;  Surgeon: Ibrahima Sanchez MD;  Location:  HEART CARDIAC CATH LAB     CV RIGHT HEART CATH MEASUREMENTS RECORDED N/A 11/30/2022    Procedure: Right Heart Catheterization;  Surgeon: Claudette Geiger MD;  Location:  HEART CARDIAC CATH LAB     CV RIGHT HEART CATH MEASUREMENTS RECORDED N/A 12/28/2022    Procedure: Right Heart Catheterization;  Surgeon: Chuck Gonzalez MD;  Location:  HEART CARDIAC CATH LAB     [id number]  Surgeon: Glenys Rodriguez MD;  Location:  HEART CARDIAC CATH LAB     Fidel De La Rosa MD;  [facility]     ZZC Peyton et al., NEJM, DOI: 10.1056/[id number])     transplant coordinator, documentation as above.     Citlaly Delgado  Dept of Nephrology and Hypertension  [facility]     no recurring infections

## 2025-06-27 NOTE — H&P PST ADULT - NSICDXPASTMEDICALHX_GEN_ALL_CORE_FT
PAST MEDICAL HISTORY:  Dry eyes, bilateral     Essential hypertension     H/O hyperthyroidism     HLD (hyperlipidemia)     LBBB (left bundle branch block)     Obesity (BMI 30.0-34.9)     Personal history UTI     Polyp of corpus uteri     Postmenopausal bleeding     Thyroid nodule     Uterine myoma      PAST MEDICAL HISTORY:  Dry eyes, bilateral     Endometrial ca     Essential hypertension     H/O hyperthyroidism     HLD (hyperlipidemia)     LBBB (left bundle branch block)     Obesity (BMI 30.0-34.9)     Personal history UTI     Polyp of corpus uteri     Postmenopausal bleeding     Thyroid nodule     Uterine myoma

## 2025-06-27 NOTE — H&P PST ADULT - ENDOCRINE COMMENTS
endocrine evaluation every 6 months- last appt 2/2025 Hx thyroid nodule with endocrine evaluation every 6 months- last appt 2/2025 - BW done regularly Hx thyroid nodule/hyperthyroid  with endocrine evaluation every 6 months- last appt 2/2025 - BW done regularly

## 2025-06-27 NOTE — H&P PST ADULT - NEGATIVE ENMT SYMPTOMS
no hearing difficulty/no tinnitus/no vertigo/no sinus symptoms no hearing difficulty/no tinnitus/no vertigo/no sinus symptoms/no throat pain/no dysphagia

## 2025-06-27 NOTE — H&P PST ADULT - GENITOURINARY COMMENTS
Hx endometrial ca - postmenopausal bleeding several months ago for several days episodically - pt now being treated for UTI - Day 3 of Bactrim  - Rx d by GYN No cervical supraclavicular lymphadenopathy palpated Hx endometrial ca - postmenopausal bleeding several months ago for several days episodically - pt now being treated for UTI - Day 3 of abx  - Rx d by GYN

## 2025-06-27 NOTE — H&P PST ADULT - ENMT
Quality 226: Preventive Care And Screening: Tobacco Use: Screening And Cessation Intervention: Patient screened for tobacco use and is an ex/non-smoker Quality 431: Preventive Care And Screening: Unhealthy Alcohol Use - Screening: Patient screened for unhealthy alcohol use using a single question and scores less than 2 times per year Quality 130: Documentation Of Current Medications In The Medical Record: Current Medications Documented Quality 110: Preventive Care And Screening: Influenza Immunization: Influenza Immunization previously received during influenza season Detail Level: Detailed details… neck

## 2025-06-27 NOTE — H&P PST ADULT - FUNCTIONAL STATUS
exercises 3 days per week/4-10 METS METS DASI 7.25 - pt restricted by arthritis and de-conditioning/4-10 METS

## 2025-06-27 NOTE — H&P PST ADULT - PROBLEM SELECTOR PLAN 1
Pt scheduled for surgery and preop instructions including instructions  for Chlorhexidine use in showering on the day of surgery, given verbally and with use of  written materials, and patient confirming understanding of such instructions using  teach back method.

## 2025-06-27 NOTE — H&P PST ADULT - NEGATIVE NEUROLOGICAL SYMPTOMS
no transient paralysis/no weakness/no paresthesias no transient paralysis/no weakness/no paresthesias/no generalized seizures

## 2025-06-27 NOTE — H&P PST ADULT - HISTORY OF PRESENT ILLNESS
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

## 2025-06-27 NOTE — H&P PST ADULT - CARDIOVASCULAR COMMENTS
Denies any DVT/PE Denies any DVT/PE - Hx LBBB - evaluated by cardio 8 yrs ago - pt sees PCP yearly with EKG done

## 2025-06-28 LAB
CULTURE RESULTS: SIGNIFICANT CHANGE UP
SPECIMEN SOURCE: SIGNIFICANT CHANGE UP

## 2025-07-02 ENCOUNTER — NON-APPOINTMENT (OUTPATIENT)
Age: 70
End: 2025-07-02

## 2025-07-02 PROBLEM — C54.1 MALIGNANT NEOPLASM OF ENDOMETRIUM: Chronic | Status: ACTIVE | Noted: 2025-06-27

## 2025-07-03 ENCOUNTER — APPOINTMENT (OUTPATIENT)
Dept: GYNECOLOGIC ONCOLOGY | Facility: HOSPITAL | Age: 70
End: 2025-07-03

## 2025-07-03 ENCOUNTER — APPOINTMENT (OUTPATIENT)
Dept: GASTROENTEROLOGY | Facility: CLINIC | Age: 70
End: 2025-07-03
Payer: MEDICARE

## 2025-07-03 PROBLEM — Z12.11 COLON CANCER SCREENING: Status: ACTIVE | Noted: 2025-07-03

## 2025-07-03 PROBLEM — E04.1 THYROID NODULE: Status: ACTIVE | Noted: 2025-07-03

## 2025-07-03 PROCEDURE — 99203 OFFICE O/P NEW LOW 30 MIN: CPT | Mod: 2W

## 2025-07-03 RX ORDER — AMLODIPINE BESYLATE 5 MG/1
5 TABLET ORAL
Refills: 0 | Status: ACTIVE | COMMUNITY

## 2025-07-03 RX ORDER — ATORVASTATIN CALCIUM 80 MG/1
TABLET, FILM COATED ORAL
Refills: 0 | Status: ACTIVE | COMMUNITY

## 2025-07-03 RX ORDER — SODIUM SULFATE, POTASSIUM SULFATE AND MAGNESIUM SULFATE 1.6; 3.13; 17.5 G/177ML; G/177ML; G/177ML
17.5-3.13-1.6 SOLUTION ORAL
Qty: 1 | Refills: 0 | Status: ACTIVE | COMMUNITY
Start: 2025-07-03 | End: 1900-01-01

## 2025-07-03 RX ORDER — MULTIVIT-MIN/FA/LYCOPEN/LUTEIN .4-300-25
TABLET ORAL
Refills: 0 | Status: ACTIVE | COMMUNITY

## 2025-07-03 RX ORDER — METHIMAZOLE 5 MG/1
5 TABLET ORAL
Refills: 0 | Status: ACTIVE | COMMUNITY

## 2025-07-03 RX ORDER — CRANBERRY FRUIT EXTRACT 200 MG
CAPSULE ORAL
Refills: 0 | Status: ACTIVE | COMMUNITY

## 2025-07-03 RX ORDER — LOSARTAN POTASSIUM 100 MG/1
100 TABLET, FILM COATED ORAL
Refills: 0 | Status: ACTIVE | COMMUNITY

## 2025-07-08 ENCOUNTER — RESULT REVIEW (OUTPATIENT)
Age: 70
End: 2025-07-08

## 2025-07-08 ENCOUNTER — OUTPATIENT (OUTPATIENT)
Dept: OUTPATIENT SERVICES | Facility: HOSPITAL | Age: 70
LOS: 1 days | End: 2025-07-08

## 2025-07-08 DIAGNOSIS — Z98.890 OTHER SPECIFIED POSTPROCEDURAL STATES: Chronic | ICD-10-CM

## 2025-07-08 DIAGNOSIS — C80.1 MALIGNANT (PRIMARY) NEOPLASM, UNSPECIFIED: ICD-10-CM

## 2025-07-08 DIAGNOSIS — Z90.89 ACQUIRED ABSENCE OF OTHER ORGANS: Chronic | ICD-10-CM

## 2025-07-08 LAB — SURGICAL PATHOLOGY STUDY: SIGNIFICANT CHANGE UP

## 2025-07-08 NOTE — ASU PATIENT PROFILE, ADULT - NSICDXPASTMEDICALHX_GEN_ALL_CORE_FT
PAST MEDICAL HISTORY:  Dry eyes, bilateral     Endometrial ca     Essential hypertension     H/O hyperthyroidism     HLD (hyperlipidemia)     LBBB (left bundle branch block)     Obesity (BMI 30.0-34.9)     Personal history UTI     Polyp of corpus uteri     Postmenopausal bleeding     Thyroid nodule     Uterine myoma

## 2025-07-08 NOTE — ASU PATIENT PROFILE, ADULT - NSICDXPASTSURGICALHX_GEN_ALL_CORE_FT
PAST SURGICAL HISTORY:  H/O colonoscopy 2014    History of tonsillectomy     S/P D&C (status post dilation and curettage) 2003

## 2025-07-09 ENCOUNTER — RESULT REVIEW (OUTPATIENT)
Age: 70
End: 2025-07-09

## 2025-07-09 ENCOUNTER — TRANSCRIPTION ENCOUNTER (OUTPATIENT)
Age: 70
End: 2025-07-09

## 2025-07-09 ENCOUNTER — APPOINTMENT (OUTPATIENT)
Dept: GASTROENTEROLOGY | Facility: HOSPITAL | Age: 70
End: 2025-07-09

## 2025-07-09 ENCOUNTER — OUTPATIENT (OUTPATIENT)
Dept: OUTPATIENT SERVICES | Facility: HOSPITAL | Age: 70
LOS: 1 days | End: 2025-07-09
Payer: MEDICARE

## 2025-07-09 VITALS
HEIGHT: 61 IN | HEART RATE: 74 BPM | WEIGHT: 192.9 LBS | TEMPERATURE: 98 F | DIASTOLIC BLOOD PRESSURE: 81 MMHG | SYSTOLIC BLOOD PRESSURE: 142 MMHG | OXYGEN SATURATION: 98 % | RESPIRATION RATE: 18 BRPM

## 2025-07-09 VITALS — RESPIRATION RATE: 18 BRPM | HEART RATE: 70 BPM | OXYGEN SATURATION: 98 %

## 2025-07-09 DIAGNOSIS — Z98.890 OTHER SPECIFIED POSTPROCEDURAL STATES: Chronic | ICD-10-CM

## 2025-07-09 DIAGNOSIS — C54.1 MALIGNANT NEOPLASM OF ENDOMETRIUM: ICD-10-CM

## 2025-07-09 DIAGNOSIS — Z90.89 ACQUIRED ABSENCE OF OTHER ORGANS: Chronic | ICD-10-CM

## 2025-07-09 PROCEDURE — 88305 TISSUE EXAM BY PATHOLOGIST: CPT | Mod: 26

## 2025-07-09 PROCEDURE — 45385 COLONOSCOPY W/LESION REMOVAL: CPT | Mod: GC

## 2025-07-09 RX ORDER — LOSARTAN POTASSIUM AND HYDROCHLOROTHIAZIDE 12.5; 5 MG/1; MG/1
1 TABLET ORAL
Refills: 0 | DISCHARGE

## 2025-07-09 RX ORDER — SULFAMETHOXAZOLE/TRIMETHOPRIM 800-160 MG
1 TABLET ORAL
Refills: 0 | DISCHARGE

## 2025-07-09 RX ORDER — METHIMAZOLE 5 MG
1 TABLET ORAL
Refills: 0 | DISCHARGE

## 2025-07-09 RX ORDER — AMLODIPINE BESYLATE 10 MG/1
1 TABLET ORAL
Refills: 0 | DISCHARGE

## 2025-07-09 RX ORDER — SODIUM CHLORIDE 9 G/1000ML
500 INJECTION, SOLUTION INTRAVENOUS
Refills: 0 | Status: DISCONTINUED | OUTPATIENT
Start: 2025-07-09 | End: 2025-07-23

## 2025-07-09 RX ORDER — ATORVASTATIN CALCIUM 80 MG/1
1 TABLET, FILM COATED ORAL
Refills: 0 | DISCHARGE

## 2025-07-09 NOTE — ASU DISCHARGE PLAN (ADULT/PEDIATRIC) - NS MD DC FALL RISK RISK
For information on Fall & Injury Prevention, visit: [email or web address] OR  [email or web address] OR  https://www.cdc.gov/steadi/patient.html

## 2025-07-09 NOTE — ASU DISCHARGE PLAN (ADULT/PEDIATRIC) - OK TO LEAVE MESSAGE ON VOICEMAIL
----- Message from Suni Berkowitz MD sent at 8/2/2022  7:02 PM CDT -----  Ultrasound of the [place] no clots. You might need more vein procedure us if like being continues. Apply Compression Stockings if possible.  
Spoke to patient with ultrasound result, patient aware and verbalized understanding.  
Tried to contact patient at home number, no answer. Mobile phone is disconnected.  
Yes
2017

## 2025-07-09 NOTE — ASU DISCHARGE PLAN (ADULT/PEDIATRIC) - FINANCIAL ASSISTANCE
[facility] provides services at a reduced cost to those who are determined to be eligible through [facility]’s financial assistance program. Information regarding [facility]’s financial assistance program can be found by going to [email or web address] or by calling [phone].

## 2025-07-09 NOTE — PRE PROCEDURE NOTE - PRE PROCEDURE EVALUATION
Pre-Endoscopy Evaluation    Attending Physician:  Dr. Gloria Hdz    Procedure: Colonoscopy    Indication for Procedure & Pertinent History: CRC Screening prior to pelvic surgery for mx of endometrial ca    PAST MEDICAL & SURGICAL HISTORY:  Polyp of corpus uteri      Essential hypertension      Dry eyes, bilateral      Obesity (BMI 30.0-34.9)      LBBB (left bundle branch block)      H/O hyperthyroidism      HLD (hyperlipidemia)      Uterine myoma      Personal history UTI      Thyroid nodule      Postmenopausal bleeding      Endometrial ca      S/P D&C (status post dilation and curettage)  2003      H/O colonoscopy  2014      History of tonsillectomy          Allergies    adhesives (Pruritus)  penicillin (Rash)    Intolerances        Medications: MEDICATIONS  (STANDING):    MEDICATIONS  (PRN):      Pertinent lab data:                      Physical Examination:  Daily     Daily   Vital Signs Last 24 Hrs  T(C): --  T(F): --  HR: --  BP: --  BP(mean): --  RR: --  SpO2: --      GENERAL: no acute distress  NEURO: alert  HEENT: NCAT, no conjunctival pallor appreciated  CHEST: no respiratory distress, no accessory muscle use  CARDIAC: regular rate, +S1/S2  ABDOMEN: soft, nontender, no rebound or guarding  EXTREMITIES: warm, well perfused  SKIN: no lesions noted    Comments:    ASA Class: I []  II [x]  III []  IV []    The patient is a suitable candidate for the planned procedure unless box checked [ ]  No, explain:    Note incomplete until finalized by attending signature/attestation.    Ronal Gibbons MD   Gastroenterology/Hepatology Fellow PGY-7  Available on [employer] Teams 7am - 5pm  [phone]

## 2025-07-11 LAB — SURGICAL PATHOLOGY STUDY: SIGNIFICANT CHANGE UP

## 2025-07-15 ENCOUNTER — NON-APPOINTMENT (OUTPATIENT)
Age: 70
End: 2025-07-15

## 2025-07-17 ENCOUNTER — NON-APPOINTMENT (OUTPATIENT)
Age: 70
End: 2025-07-17

## 2025-07-17 ENCOUNTER — APPOINTMENT (OUTPATIENT)
Dept: SURGICAL ONCOLOGY | Facility: CLINIC | Age: 70
End: 2025-07-17
Payer: MEDICARE

## 2025-07-17 VITALS
OXYGEN SATURATION: 97 % | BODY MASS INDEX: 35.51 KG/M2 | RESPIRATION RATE: 17 BRPM | DIASTOLIC BLOOD PRESSURE: 76 MMHG | HEART RATE: 78 BPM | HEIGHT: 62 IN | WEIGHT: 193 LBS | SYSTOLIC BLOOD PRESSURE: 115 MMHG

## 2025-07-17 PROCEDURE — 99204 OFFICE O/P NEW MOD 45 MIN: CPT

## 2025-07-21 ENCOUNTER — APPOINTMENT (OUTPATIENT)
Dept: GYNECOLOGIC ONCOLOGY | Facility: CLINIC | Age: 70
End: 2025-07-21

## 2025-07-21 ENCOUNTER — NON-APPOINTMENT (OUTPATIENT)
Age: 70
End: 2025-07-21

## 2025-07-24 ENCOUNTER — TRANSCRIPTION ENCOUNTER (OUTPATIENT)
Age: 70
End: 2025-07-24

## 2025-07-24 ENCOUNTER — RESULT REVIEW (OUTPATIENT)
Age: 70
End: 2025-07-24

## 2025-07-24 ENCOUNTER — APPOINTMENT (OUTPATIENT)
Dept: GYNECOLOGIC ONCOLOGY | Facility: HOSPITAL | Age: 70
End: 2025-07-24

## 2025-07-24 ENCOUNTER — OUTPATIENT (OUTPATIENT)
Dept: OUTPATIENT SERVICES | Facility: HOSPITAL | Age: 70
LOS: 1 days | End: 2025-07-24
Payer: MEDICARE

## 2025-07-24 ENCOUNTER — APPOINTMENT (OUTPATIENT)
Dept: SURGICAL ONCOLOGY | Facility: HOSPITAL | Age: 70
End: 2025-07-24

## 2025-07-24 VITALS
TEMPERATURE: 98 F | HEART RATE: 79 BPM | DIASTOLIC BLOOD PRESSURE: 71 MMHG | WEIGHT: 192.02 LBS | HEIGHT: 61 IN | RESPIRATION RATE: 15 BRPM | OXYGEN SATURATION: 97 % | SYSTOLIC BLOOD PRESSURE: 139 MMHG

## 2025-07-24 VITALS
SYSTOLIC BLOOD PRESSURE: 142 MMHG | OXYGEN SATURATION: 95 % | TEMPERATURE: 99 F | DIASTOLIC BLOOD PRESSURE: 64 MMHG | RESPIRATION RATE: 14 BRPM | HEART RATE: 88 BPM

## 2025-07-24 DIAGNOSIS — C54.1 MALIGNANT NEOPLASM OF ENDOMETRIUM: ICD-10-CM

## 2025-07-24 DIAGNOSIS — Z98.890 OTHER SPECIFIED POSTPROCEDURAL STATES: Chronic | ICD-10-CM

## 2025-07-24 DIAGNOSIS — Z90.89 ACQUIRED ABSENCE OF OTHER ORGANS: Chronic | ICD-10-CM

## 2025-07-24 LAB
BLD GP AB SCN SERPL QL: NEGATIVE — SIGNIFICANT CHANGE UP
GLUCOSE BLDC GLUCOMTR-MCNC: 110 MG/DL — HIGH (ref 70–99)
RH IG SCN BLD-IMP: POSITIVE — SIGNIFICANT CHANGE UP

## 2025-07-24 PROCEDURE — 88365 INSITU HYBRIDIZATION (FISH): CPT | Mod: 26

## 2025-07-24 PROCEDURE — 38570 LAPAROSCOPY LYMPH NODE BIOP: CPT

## 2025-07-24 PROCEDURE — 58571 TLH W/T/O 250 G OR LESS: CPT

## 2025-07-24 PROCEDURE — 88341 IMHCHEM/IMCYTCHM EA ADD ANTB: CPT | Mod: 26

## 2025-07-24 PROCEDURE — 88342 IMHCHEM/IMCYTCHM 1ST ANTB: CPT | Mod: 26

## 2025-07-24 PROCEDURE — 88309 TISSUE EXAM BY PATHOLOGIST: CPT | Mod: 26

## 2025-07-24 PROCEDURE — S2900 ROBOTIC SURGICAL SYSTEM: CPT | Mod: NC

## 2025-07-24 PROCEDURE — 44204 LAPARO PARTIAL COLECTOMY: CPT

## 2025-07-24 PROCEDURE — 88305 TISSUE EXAM BY PATHOLOGIST: CPT | Mod: 26

## 2025-07-24 PROCEDURE — 88364 INSITU HYBRIDIZATION (FISH): CPT | Mod: 26

## 2025-07-24 PROCEDURE — 88307 TISSUE EXAM BY PATHOLOGIST: CPT | Mod: 26

## 2025-07-24 PROCEDURE — 38900 IO MAP OF SENT LYMPH NODE: CPT | Mod: 50

## 2025-07-24 DEVICE — STAPLER RELOAD SUREFORM 4-ROW 30X8MM BLU: Type: IMPLANTABLE DEVICE | Status: FUNCTIONAL

## 2025-07-24 DEVICE — ARISTA 3GR: Type: IMPLANTABLE DEVICE | Status: FUNCTIONAL

## 2025-07-24 RX ORDER — ACETAMINOPHEN 500 MG/5ML
3 LIQUID (ML) ORAL
Qty: 0 | Refills: 0 | DISCHARGE

## 2025-07-24 RX ORDER — IBUPROFEN 200 MG
1 TABLET ORAL
Qty: 28 | Refills: 0
Start: 2025-07-24 | End: 2025-07-30

## 2025-07-24 RX ORDER — SODIUM CHLORIDE 9 G/1000ML
1000 INJECTION, SOLUTION INTRAVENOUS
Refills: 0 | Status: DISCONTINUED | OUTPATIENT
Start: 2025-07-24 | End: 2025-08-07

## 2025-07-24 RX ORDER — OXYCODONE HYDROCHLORIDE 30 MG/1
1 TABLET ORAL
Qty: 8 | Refills: 0
Start: 2025-07-24

## 2025-07-24 RX ORDER — HYDROMORPHONE/SOD CHLOR,ISO/PF 2 MG/10 ML
0.5 SYRINGE (ML) INJECTION
Refills: 0 | Status: DISCONTINUED | OUTPATIENT
Start: 2025-07-24 | End: 2025-07-24

## 2025-07-24 RX ORDER — ACETAMINOPHEN 500 MG/5ML
1000 LIQUID (ML) ORAL ONCE
Refills: 0 | Status: DISCONTINUED | OUTPATIENT
Start: 2025-07-24 | End: 2025-08-07

## 2025-07-24 RX ORDER — ONDANSETRON HCL/PF 4 MG/2 ML
4 VIAL (ML) INJECTION ONCE
Refills: 0 | Status: DISCONTINUED | OUTPATIENT
Start: 2025-07-24 | End: 2025-08-07

## 2025-07-31 ENCOUNTER — APPOINTMENT (OUTPATIENT)
Facility: CLINIC | Age: 70
End: 2025-07-31

## 2025-08-05 LAB — SURGICAL PATHOLOGY STUDY: SIGNIFICANT CHANGE UP

## 2025-08-07 ENCOUNTER — APPOINTMENT (OUTPATIENT)
Facility: CLINIC | Age: 70
End: 2025-08-07

## 2025-08-08 ENCOUNTER — APPOINTMENT (OUTPATIENT)
Dept: GYNECOLOGIC ONCOLOGY | Facility: CLINIC | Age: 70
End: 2025-08-08
Payer: MEDICARE

## 2025-08-08 VITALS
HEIGHT: 62 IN | DIASTOLIC BLOOD PRESSURE: 68 MMHG | OXYGEN SATURATION: 96 % | WEIGHT: 185 LBS | SYSTOLIC BLOOD PRESSURE: 163 MMHG | BODY MASS INDEX: 34.04 KG/M2 | HEART RATE: 85 BPM | TEMPERATURE: 98.1 F

## 2025-08-08 DIAGNOSIS — C54.1 MALIGNANT NEOPLASM OF ENDOMETRIUM: ICD-10-CM

## 2025-08-08 DIAGNOSIS — K38.8 OTHER SPECIFIED DISEASES OF APPENDIX: ICD-10-CM

## 2025-08-08 PROCEDURE — 99024 POSTOP FOLLOW-UP VISIT: CPT

## 2025-08-13 ENCOUNTER — NON-APPOINTMENT (OUTPATIENT)
Age: 70
End: 2025-08-13

## 2025-08-21 ENCOUNTER — APPOINTMENT (OUTPATIENT)
Dept: SURGICAL ONCOLOGY | Facility: CLINIC | Age: 70
End: 2025-08-21
Payer: MEDICARE

## 2025-08-21 ENCOUNTER — NON-APPOINTMENT (OUTPATIENT)
Age: 70
End: 2025-08-21

## 2025-08-21 VITALS
OXYGEN SATURATION: 96 % | HEIGHT: 62 IN | BODY MASS INDEX: 34.04 KG/M2 | DIASTOLIC BLOOD PRESSURE: 83 MMHG | HEART RATE: 85 BPM | WEIGHT: 185 LBS | SYSTOLIC BLOOD PRESSURE: 156 MMHG

## 2025-08-21 DIAGNOSIS — K38.8 OTHER SPECIFIED DISEASES OF APPENDIX: ICD-10-CM

## 2025-08-21 DIAGNOSIS — C54.1 MALIGNANT NEOPLASM OF ENDOMETRIUM: ICD-10-CM

## 2025-08-21 PROCEDURE — 99024 POSTOP FOLLOW-UP VISIT: CPT

## 2025-09-11 ENCOUNTER — APPOINTMENT (OUTPATIENT)
Facility: CLINIC | Age: 70
End: 2025-09-11
Payer: MEDICARE

## 2025-09-11 VITALS
SYSTOLIC BLOOD PRESSURE: 164 MMHG | DIASTOLIC BLOOD PRESSURE: 75 MMHG | OXYGEN SATURATION: 95 % | HEART RATE: 81 BPM | TEMPERATURE: 97.8 F | BODY MASS INDEX: 34.98 KG/M2 | WEIGHT: 191.25 LBS

## 2025-09-11 PROCEDURE — 99024 POSTOP FOLLOW-UP VISIT: CPT

## 2025-09-15 ENCOUNTER — APPOINTMENT (OUTPATIENT)
Dept: HEMATOLOGY ONCOLOGY | Facility: CLINIC | Age: 70
End: 2025-09-15
Payer: MEDICARE

## 2025-09-15 VITALS
SYSTOLIC BLOOD PRESSURE: 144 MMHG | RESPIRATION RATE: 17 BRPM | OXYGEN SATURATION: 97 % | HEART RATE: 84 BPM | BODY MASS INDEX: 35.33 KG/M2 | DIASTOLIC BLOOD PRESSURE: 80 MMHG | WEIGHT: 192 LBS | TEMPERATURE: 97 F | HEIGHT: 62 IN

## 2025-09-15 DIAGNOSIS — Z80.0 FAMILY HISTORY OF MALIGNANT NEOPLASM OF DIGESTIVE ORGANS: ICD-10-CM

## 2025-09-15 DIAGNOSIS — C88.40 EXTRNOD MRGNL B-CELL LYMPH MUCOSA-ASSOC LYM TISS NOT REMIS: ICD-10-CM

## 2025-09-15 DIAGNOSIS — Z87.891 PERSONAL HISTORY OF NICOTINE DEPENDENCE: ICD-10-CM

## 2025-09-15 DIAGNOSIS — C54.1 MALIGNANT NEOPLASM OF ENDOMETRIUM: ICD-10-CM

## 2025-09-15 PROCEDURE — 99205 OFFICE O/P NEW HI 60 MIN: CPT

## 2025-09-17 DIAGNOSIS — C88.40 EXTRNOD MRGNL B-CELL LYMPH MUCOSA-ASSOC LYM TISS NOT REMIS: ICD-10-CM

## 2025-09-24 LAB
ALBUMIN SERPL ELPH-MCNC: 4.5 G/DL
ALP BLD-CCNC: 83 U/L
ALT SERPL-CCNC: 24 U/L
ANION GAP SERPL CALC-SCNC: 14 MMOL/L
AST SERPL-CCNC: 20 U/L
BILIRUB SERPL-MCNC: 2 MG/DL
BUN SERPL-MCNC: 13 MG/DL
CALCIUM SERPL-MCNC: 10.1 MG/DL
CHLORIDE SERPL-SCNC: 104 MMOL/L
CO2 SERPL-SCNC: 24 MMOL/L
CREAT SERPL-MCNC: 0.71 MG/DL
EGFRCR SERPLBLD CKD-EPI 2021: 90 ML/MIN/1.73M2
GLUCOSE SERPL-MCNC: 95 MG/DL
POTASSIUM SERPL-SCNC: 4 MMOL/L
PROT SERPL-MCNC: 7.1 G/DL
SODIUM SERPL-SCNC: 141 MMOL/L

## 2025-09-26 LAB
HAV IGM SER QL: NONREACTIVE
HBV CORE IGM SER QL: NONREACTIVE
HBV SURFACE AG SER QL: NONREACTIVE
HCV AB SER QL: NONREACTIVE
HCV S/CO RATIO: 0.09 S/CO

## (undated) DEVICE — CATH IV SAFE BC 22G X 1" (BLUE)

## (undated) DEVICE — DRAPE 3/4 SHEET 52X76"

## (undated) DEVICE — POSITIONER PURPLE ARM ONE STEP (LARGE)

## (undated) DEVICE — SUT SOFSILK 3-0 30" V-20

## (undated) DEVICE — SUT POLYSORB 4-0 P-12 UNDYED

## (undated) DEVICE — MYOSURE SCOPE SEAL

## (undated) DEVICE — XI STAPLER SUREFORM 60

## (undated) DEVICE — SOL IRR POUR NS 0.9% 500ML

## (undated) DEVICE — STAPLER SUREFORM CRV TIP 30X8MM

## (undated) DEVICE — PACK IV START WITH CHG

## (undated) DEVICE — XI OBTURATOR OPTICAL BLADELESS 8MM

## (undated) DEVICE — Device

## (undated) DEVICE — APPLICATOR FOR ARISTA SHORT 14CM

## (undated) DEVICE — PSP-SCD MACHINE: Type: DURABLE MEDICAL EQUIPMENT

## (undated) DEVICE — XI SCISSOR TIP COVER

## (undated) DEVICE — SUT SOFSILK 2-0 30" V-20

## (undated) DEVICE — DRAIN PENROSE .25" X 18" LATEX

## (undated) DEVICE — FLUENT FMS PROCEDURE KIT

## (undated) DEVICE — FOLEY TRAY 16FR 5CC LTX UMETER CLOSED

## (undated) DEVICE — XI STAPLER SUREFORM 45

## (undated) DEVICE — WARMING BLANKET UPPER ADULT

## (undated) DEVICE — SALIVA EJECTOR (BLUE)

## (undated) DEVICE — SOL IRR BAG NS 0.9% 3000ML

## (undated) DEVICE — UTERINE MANIPULATOR CONMED VCARE LG 37MM

## (undated) DEVICE — DRSG BANDAID 0.75X3"

## (undated) DEVICE — XI SEAL UNIVERSIAL 5-12MM

## (undated) DEVICE — XI ARM GRASPER TIP UP FENESTRATED

## (undated) DEVICE — TUBING SUCTION 20FT

## (undated) DEVICE — LIGASURE IMPACT

## (undated) DEVICE — LUBRICATING JELLY HR ONE SHOT 3G

## (undated) DEVICE — POSITIONER FOAM EGG CRATE ULNAR 2PCS (PINK)

## (undated) DEVICE — SUT BOOT STANDARD (RED) 5 PAIR

## (undated) DEVICE — PACK ROBOTIC

## (undated) DEVICE — BASIN EMESIS 10IN GRADUATED MAUVE

## (undated) DEVICE — D HELP - CLEARVIEW CLEARIFY SYSTEM

## (undated) DEVICE — ENDOCATCH 10MM

## (undated) DEVICE — XI ARM FORCEP FENESTRATED BIPOLAR 8MM

## (undated) DEVICE — XI VESSEL SEALER

## (undated) DEVICE — GOWN LG

## (undated) DEVICE — PREP TRAY DRY SKIN PREP SCRUB

## (undated) DEVICE — DISSECTOR ENDOSCOPIC KITTNER SINGLE TIP

## (undated) DEVICE — CONTAINER FORMALIN 80ML YELLOW

## (undated) DEVICE — ELECTRO LUBE ANTI-STICK SOLUTION FOR CAUTERY TIP

## (undated) DEVICE — ELCTR BOVIE PENCIL SMOKE EVACUATION

## (undated) DEVICE — BLADE SCALPEL SAFETYLOCK #15

## (undated) DEVICE — BLADE SCALPEL SAFETYLOCK #10

## (undated) DEVICE — BIOPSY FORCEP RADIAL JAW 4 STANDARD WITH NEEDLE

## (undated) DEVICE — TUBING SUCTION NONCONDUCTIVE 6MM X 12FT

## (undated) DEVICE — TUBING IV SET GRAVITY 3Y 100" MACRO

## (undated) DEVICE — GOWN XXXL

## (undated) DEVICE — ENDOCATCH 5MM INZII

## (undated) DEVICE — FOLEY TRAY 16FR 5CC LF UMETER CLOSED

## (undated) DEVICE — SPONGE PEANUT AUTO COUNT

## (undated) DEVICE — DRAPE INSTRUMENT POUCH 6.75" X 11"

## (undated) DEVICE — DRSG 2X2

## (undated) DEVICE — DRSG MASTISOL

## (undated) DEVICE — SNARE LESIONHUNTER ROTAT NITNL COLD 10MM

## (undated) DEVICE — NDL HYPO SAFE 22G X 1.5" (BLACK)

## (undated) DEVICE — VENODYNE/SCD SLEEVE CALF LARGE

## (undated) DEVICE — ELCTR ECG CONDUCTIVE ADHESIVE

## (undated) DEVICE — DRAPE IOBAN 23" X 23"

## (undated) DEVICE — XI ARM NEEDLE DRIVER SUTURECUT MEGA 8MM

## (undated) DEVICE — TUBING STRYKEFLOW II SUCTION / IRRIGATOR

## (undated) DEVICE — DRAPE WARMING SOLUTION 44 X 44"

## (undated) DEVICE — BIOPSY FORCEP COLD DISP

## (undated) DEVICE — POLY TRAP ETRAP

## (undated) DEVICE — PREP SCRUB BRUSH W CHG 4%

## (undated) DEVICE — SUT VICRYL 0 27" UR-6

## (undated) DEVICE — PACK PERI GYN

## (undated) DEVICE — SUT VICRYL 0 18" ENDOLOOP LIGATURE

## (undated) DEVICE — WARMING BLANKET LOWER ADULT

## (undated) DEVICE — VENODYNE/SCD SLEEVE CALF MEDIUM

## (undated) DEVICE — VESSEL LOOP MAXI-YELLOW  0.120" X 16"

## (undated) DEVICE — SUT MAXON 1 96" T-60

## (undated) DEVICE — TUBING MEDI-VAC W MAXIGRIP CONNECTORS 1/4"X6'

## (undated) DEVICE — SUT MONOCRYL 4-0 27" PS-2 UNDYED

## (undated) DEVICE — TUBING AIRSEAL TRI-LUMEN FILTERED

## (undated) DEVICE — DRAIN RESERVOIR FOR JACKSON PRATT 100CC CARDINAL

## (undated) DEVICE — XI ARM FORCEP PROGRASP 8MM

## (undated) DEVICE — DRAPE MAYO STAND 23"

## (undated) DEVICE — UTERINE MANIPULATOR CONMED VCARE MED 34MM

## (undated) DEVICE — LUBRICATING JELLY ONESHOT 1.25OZ

## (undated) DEVICE — UTERINE MANIPULATOR CONMED VCARE SM 32MM

## (undated) DEVICE — PACK LITHOTOMY

## (undated) DEVICE — HANDSET ARGON

## (undated) DEVICE — XI ARM FORCE BIPOLAR 8MM

## (undated) DEVICE — LAP PAD 4 X 18"

## (undated) DEVICE — ELCTR GROUNDING PAD ADULT COVIDIEN

## (undated) DEVICE — STAPLER COVIDIEN ENDO GIA STANDARD HANDLE

## (undated) DEVICE — PREP CHLOROHEXIDINE 4% 118CC KIT

## (undated) DEVICE — POSITIONER FOAM HEAD CRADLE (PINK)

## (undated) DEVICE — SPECIMEN CONTAINER 100ML

## (undated) DEVICE — INSUFFLATION NDL COVIDIEN SURGINEEDLE VERESS 120MM

## (undated) DEVICE — DRSG CURITY GAUZE SPONGE 4 X 4" 12-PLY NON-STERILE

## (undated) DEVICE — PACK D&C

## (undated) DEVICE — XI ARM SCISSOR MONO CURVED

## (undated) DEVICE — SOL IRR POUR H2O 250ML

## (undated) DEVICE — GLV 6.5 PROTEXIS (WHITE)